# Patient Record
Sex: FEMALE | Race: OTHER
[De-identification: names, ages, dates, MRNs, and addresses within clinical notes are randomized per-mention and may not be internally consistent; named-entity substitution may affect disease eponyms.]

---

## 2018-10-13 NOTE — EMERGENCY ROOM REPORT
History of Present Illness


General


Chief Complaint:  Sore Throat


Source:  Patient





Present Illness


HPI


Patient presents with a sore throat.  It was severe yesterday.  She also has 

some tongue pain and redness there.  She has muscle aches.  She's not taking 

any medication at home.  She is able to drink fluids now.  Last week she had 

some nausea and diarrhea.  She denies any cough.  She denies dysuria and doesn'

t believe she is pregnant at this time.  Her periods are irregular.  Pain rated 

6/10, constant and worse with swallowing, burning.





No headache.  Able to tolerate oral fluids.





No rashes.


Allergies:  


Coded Allergies:  


     No Known Allergies (Unverified , 10/13/18)





Patient History


Past Medical History:  see triage record


Social History:  Denies: smoking - former


Social History Narrative


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


Last Menstrual Period:  09/14/18


Pregnant Now:  No


Reviewed Nursing Documentation:  PMH: Agreed; PSxH: Agreed





Nursing Documentation-PMH


Past Medical History:  No History, Except For


History Of Psychiatric Problem:  Yes - depression





Review of Systems


Constitutional:  Reports: see HPI


Eye:  Denies: nose congestion, discharge


ENT:  Reports: see HPI


Respiratory:  Reports: see HPI


Gastrointestinal:  Reports: see HPI


Skin:  Denies: rash


Neurological:  Reports: see HPI





Physical Exam





Vital Signs








  Date Time  Temp Pulse Resp B/P (MAP) Pulse Ox O2 Delivery O2 Flow Rate FiO2


 


10/13/18 00:02 98.3 93 16 132/89 96 Room Air  





 98.2       








Sp02 EP Interpretation:  reviewed, normal


General Appearance:  well appearing, no apparent distress


Head:  normocephalic, atraumatic


ENT:  moist mucus membranes, pharyngeal erythema, other - tongue lesion also, 

no exudates


Neck:  full range of motion, supple


Respiratory:  chest non-tender, lungs clear, normal breath sounds, no 

respiratory distress, speaking full sentences


Cardiovascular #1:  regular rate, rhythm


Cardiovascular #2:  2+ radial (R)


Gastrointestinal:  non tender, overweight


Musculoskeletal:  gait/station normal, normal range of motion


Neurologic:  alert, oriented x3, normal gait, grossly normal


Psychiatric:  mood/affect normal


Skin:  no rash





Medical Decision Making


Diagnostic Impression:  


 Primary Impression:  


 Sore throat


 Additional Impression:  


 Aphthous ulcer


ER Course


Patient presents with a sore throat tongue lesion.  Differential includes strep

, viral, aphthous stomatitis amongst others.  The appearance is against a strep 

at this time along with the tongue lesion.  She'll be treated with Tylenol, 

prednisone and viscous lidocaine here.  Antibiotics are not indicated at this 

time.





Improved but not like viscous lidocaine.  Pain reported decreased.





Discussed outpatient treatment plan.





Patient stable for outpatient observation and treatment.





Last Vital Signs








  Date Time  Temp Pulse Resp B/P (MAP) Pulse Ox O2 Delivery O2 Flow Rate FiO2


 


10/13/18 00:52 98.2 93 16 132/89 96 Room Air  





 208.8       








Status:  improved


Disposition:  HOME, SELF-CARE


Condition:  Improved


Scripts


Prednisone* (PREDNISONE*) 20 Mg Tablet


20 MG ORAL DAILY, #3 TAB


   Prov: Mark Link M.D.         10/13/18











Mark Link M.D. Oct 13, 2018 00:33

## 2020-11-24 ENCOUNTER — HOSPITAL ENCOUNTER (EMERGENCY)
Dept: HOSPITAL 72 - EMR | Age: 35
Discharge: HOME | End: 2020-11-24
Payer: COMMERCIAL

## 2020-11-24 VITALS — SYSTOLIC BLOOD PRESSURE: 135 MMHG | DIASTOLIC BLOOD PRESSURE: 81 MMHG

## 2020-11-24 VITALS — DIASTOLIC BLOOD PRESSURE: 85 MMHG | SYSTOLIC BLOOD PRESSURE: 133 MMHG

## 2020-11-24 VITALS — BODY MASS INDEX: 45.52 KG/M2 | HEIGHT: 67 IN | WEIGHT: 290 LBS

## 2020-11-24 DIAGNOSIS — Z77.21: Primary | ICD-10-CM

## 2020-11-24 PROCEDURE — 86703 HIV-1/HIV-2 1 RESULT ANTBDY: CPT

## 2020-11-24 PROCEDURE — 86803 HEPATITIS C AB TEST: CPT

## 2020-11-24 PROCEDURE — 87517 HEPATITIS B DNA QUANT: CPT

## 2020-11-24 PROCEDURE — 99283 EMERGENCY DEPT VISIT LOW MDM: CPT
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
## 2020-11-24 NOTE — NUR
ER DISCHARGE NOTE:



Patient is cleared to be discharged per ERMD, pt is aox4, on room air, with 
stable vital signs. pt was given dc instructions, pt was able to verbalize 
understanding, pt id band removed. pt is able to ambulate with steady gait. pt 
took all belongings and informed to follow up with her lab results from today's 
ED visit.
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
